# Patient Record
(demographics unavailable — no encounter records)

---
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
## 2024-10-15 NOTE — HISTORY OF PRESENT ILLNESS
[Lower back] : lower back [Buttock] : buttock [Left Leg] : left leg [Work related] : work related [Dull/Aching] : dull/aching [Radiating] : radiating [Shooting] : shooting [Throbbing] : throbbing [Constant] : constant [Household chores] : household chores [Leisure] : leisure [Work] : work [Sleep] : sleep [Rest] : rest [Nothing helps with pain getting better] : Nothing helps with pain getting better [Sitting] : sitting [Standing] : standing [Walking] : walking [Lying in bed] : lying in bed [6] : 6 [7] : 7 [FreeTextEntry1] : WC - DOI 10/17/2023  10/15/2024 SHARON BOSE is a 54 year-old woman presenting for a RPV for a history of chronic low back pain with LLE radicular features. On 9/27/2024, the patient underwent an L5-S1 LESI w/o MAC. She notes having 60% improvement of pain for 2 weeks but notes having some recurrence/worsening of pain after that time. Notes pain in the mid and low back with radiation to the left gluteal region and posterior thigh. No focal numbness or weakness in the lower extremities. No bowel or bladder incontinence or saddle anesthesia. The patient states that average pain over the past week was 6/10 in severity. Mood: Patient notes depression and anxiety. Sleep: Patient notes ongoing difficulty with sleep 2/2 pain.   8/16/2024: SHARON BOSE is a 54 year-old woman presenting for a RPV for a history of chronic low back pain with LLE radicular features. The patient underwent TPI in the left lumbar paraspinal region at last visit on 7/12/2024. The patient continues to note an aching pain across the low back with radiation to the left gluteal region and posterior thigh. No focal numbness or weakness in the lower extremities. No bowel or bladder incontinence or saddle anesthesia. The patient states that average pain over the past week was 6/10 in severity. Mood: Patient notes depression and anxiety. Sleep: Patient notes ongoing difficulty with sleep 2/2 pain.   7/12/2024 SHARON BOSE is a 54 year-old woman presenting for a RPV for a history of chronic low back pain with radiation to the left lower extremity. The patient underwent a LEFT L5-S1 TFESI w/o MAC on 6/26/2024. She had about 50% relief from buttocks pain. She notes some improvement with sitting and standing for longer periods. No focal numbness or weakness in the lower extremities. No bowel or bladder incontinence or saddle anesthesia. She continues to have an aching pain in the left low back with radiation to the left gluteal region and posterior thigh.  The patient states that average pain over the past week was 7/10 in severity. Mood: Patient notes depression and anxiety. Sleep: Patient notes ongoing difficulty with sleep 2/2 pain.   4/26/2024: SHARON BOSE is a 53 year-old woman presenting for a NPV for a history of chronic low back pain with radiation to the left lower extremity. The patient notes having acute onset of pain in the left gluteal region and posterior thigh and was diagnosed with a tear of the left hamstring. She has undergone PT with some improvement of pain. At this point, the patient endorses an aching pain across the low lumbar region with radiation to the left gluteal region and posterior thigh and leg. No focal numbness or weakness in the lower extremities. No bowel or bladder incontinence or saddle anesthesia. Pain is worse with sitting or standing for long periods.  The patient states that average pain over the past week was 5/10 in severity. Mood: Patient notes depression and anxiety. She has been taking medication for this but recently discontinued.  Sleep: Patient notes significant disturbance of sleep initiation and maintenance 2/2 pain.  [] : no [FreeTextEntry3] : 10/17/2023 [FreeTextEntry7] : L leg  [TWNoteComboBox1] : 0%

## 2024-10-15 NOTE — DATA REVIEWED
[MRI] : MRI [Lumbar Spine] : lumbar spine [Report was reviewed and noted in the chart] : The report was reviewed and noted in the chart [I independently reviewed and interpreted images and report] : I independently reviewed and interpreted images and report [FreeTextEntry1] : MRI Lumbar spine 10/25/2023: L2-L3: disc bulging, bony ridging, facet arthrosis, mild central stenosis and moderate bilateral NF narrowing L3-L4: disc bulging, bony ridging, facet arthrosis, mild central stenosis and moderate bilateral NF narrowing L4-L5: disc bulging, bony ridging, facet arthrosis, and moderate RIGHT NF narrowing and mild left NF narrowing L5-S1: disc bulging, bony ridging, facet arthrosis, and left foraminal herniation impinging on the LEFT L5 nerve root w/ asymmetric LEFT NF narrowing  MRI left femur 10/25/2023: High grade tearing at the left hamstring tendon insertion with 70-80% disruption and severe surrounding soft tissue swelling and bursitis

## 2024-10-15 NOTE — PHYSICAL EXAM
[de-identified] : Gen: NAD Head: NC/AT Eyes: no glasses, no scleral icterus ENT: mucous membranes moist CV: No JVD Lungs: nonlabored breathing Abd: soft, NT/ND Ext: full ROM in all extremities, no peripheral edema Back: +TTP in the bilateral low lumbar facet region; +SLR on the LEFT, +seated slump test on the left Neuro: CN intact LEs +5 L +5 R hip flexion +5 L +5 R leg extension +5 L +5 R leg flexion +5 L +5 R foot dorsiflexion +5 L +5 R foot plantarflexion +5 L +5 R EHL extension Psych: normal affect Skin: no visible lesions

## 2024-10-15 NOTE — PHYSICAL EXAM
[de-identified] : Gen: NAD Head: NC/AT Eyes: no glasses, no scleral icterus ENT: mucous membranes moist CV: No JVD Lungs: nonlabored breathing Abd: soft, NT/ND Ext: full ROM in all extremities, no peripheral edema Back: +TTP in the bilateral low lumbar facet region; +SLR on the LEFT, +seated slump test on the left Neuro: CN intact LEs +5 L +5 R hip flexion +5 L +5 R leg extension +5 L +5 R leg flexion +5 L +5 R foot dorsiflexion +5 L +5 R foot plantarflexion +5 L +5 R EHL extension Psych: normal affect Skin: no visible lesions

## 2024-10-31 NOTE — PLAN
[FreeTextEntry1] : Hereditary cancer gene screening offered and drawn Recommend colonoscopy Yearly mammo

## 2024-11-22 NOTE — IMAGING
[de-identified] : LSPINE Inspection: No rash or ecchymosis Palpation: No tenderness to palpation or spasm in bilateral thoracic., no SI joint tenderness to palpation  + Left lumbar paraspinal musculature ROM: diminished ROM secondary to pain Strength: 5/5 bilateral hip flexors, knee extensors, ankle dorsiflexors, EHL, ankle plantar flexors Sensation: Sensation present to light touch bilateral L2-S1 distributions Provocative maneuvers: + Left straight leg raise [Facet arthropathy] : Facet arthropathy [Scoliosis] : Scoliosis [AP] : anteroposterior [There are no fractures, subluxations or dislocations. No significant abnormalities are seen] : There are no fractures, subluxations or dislocations. No significant abnormalities are seen [FreeTextEntry1] : 28 levoconvex scoli L2-L4

## 2024-11-22 NOTE — ASSESSMENT
[FreeTextEntry1] : 28 levoconvex scoli L2-L4 L NF narrowing L2/3; Hypertrophic facet with effusion L5/S1 w/ L LR and NF stenosis  Will update MRI Discussed a focused approach including left-sided decompression L5/S1 to decompress the neural elements, while leaving her scoliosis alone
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
## 2025-01-24 NOTE — IMAGING
[Facet arthropathy] : Facet arthropathy [Scoliosis] : Scoliosis [AP] : anteroposterior [There are no fractures, subluxations or dislocations. No significant abnormalities are seen] : There are no fractures, subluxations or dislocations. No significant abnormalities are seen [de-identified] : LSPINE  Palpation: No tenderness to palpation or spasm in bilateral thoracic., no SI joint tenderness to palpation  + Left lumbar paraspinal musculature ROM: diminished ROM secondary to pain Strength: 5/5 bilateral hip flexors, knee extensors, ankle dorsiflexors, EHL, ankle plantar flexors Sensation: Sensation present to light touch bilateral L2-S1 distributions Provocative maneuvers: + Left straight leg raise [FreeTextEntry1] : 28 levoconvex scoli L2-L4
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
## 2025-01-24 NOTE — ASSESSMENT
[FreeTextEntry1] : 28 levoconvex scoli L2-L4 L NF narrowing L2/3; Hypertrophic facet with effusion L5/S1 w/ L LR and NF stenosis  Discussed indications for surgery  Isolated fusion L5/S1 below her scoliosis not necessarily feasible  Gabapentin- Patient advised of sedating effects, instructed not to drive, operate machinery, or take with other sedating medications. Advised of need to taper on/off medication and risk of abruptly stopping gabapentin.

## 2025-02-14 NOTE — PHYSICAL EXAM
[de-identified] : Gen: NAD Head: NC/AT Eyes: no glasses, no scleral icterus ENT: mucous membranes moist CV: No JVD Lungs: nonlabored breathing Abd: soft, NT/ND Ext: full ROM in all extremities, no peripheral edema Back: +TTP in the bilateral low lumbar facet region; +SLR on the LEFT, +seated slump test on the left Neuro: CN intact LEs +5 L +5 R hip flexion +5 L +5 R leg extension +5 L +5 R leg flexion +5 L +5 R foot dorsiflexion +5 L +5 R foot plantarflexion +5 L +5 R EHL extension Psych: normal affect Skin: no visible lesions
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
## 2025-02-14 NOTE — HISTORY OF PRESENT ILLNESS
[7] : 7 [FreeTextEntry1] : WC - DOI 10/17/2023  2/14/2025 SHAORN BOSE is a 54 year-old woman presenting for a RPV for a history of chronic low back pain. Patient notes that she continues to have significant pain in the low back w/ radiation to the left gluteal region and posterior thigh and leg. No focal numbness or weakness in the lower extremities. No bowel or bladder incontinence or saddle anesthesia. Discussed surgical tx w/ Dr. Aguayo but does not feel ready to undergo fusion.  The patient states that average pain over the past week was 7/10 in severity. Mood: Patient notes depression and anxiety. Sleep: Patient notes ongoing difficulty with sleep 2/2 pain.   10/15/2024 SHARON BOSE is a 54 year-old woman presenting for a RPV for a history of chronic low back pain with LLE radicular features. On 9/27/2024, the patient underwent an L5-S1 LESI w/o MAC. She notes having 60% improvement of pain for 2 weeks but notes having some recurrence/worsening of pain after that time. Notes pain in the mid and low back with radiation to the left gluteal region and posterior thigh. No focal numbness or weakness in the lower extremities. No bowel or bladder incontinence or saddle anesthesia. The patient states that average pain over the past week was 6/10 in severity. Mood: Patient notes depression and anxiety. Sleep: Patient notes ongoing difficulty with sleep 2/2 pain.   8/16/2024: SHARON BOSE is a 54 year-old woman presenting for a RPV for a history of chronic low back pain with LLE radicular features. The patient underwent TPI in the left lumbar paraspinal region at last visit on 7/12/2024. The patient continues to note an aching pain across the low back with radiation to the left gluteal region and posterior thigh. No focal numbness or weakness in the lower extremities. No bowel or bladder incontinence or saddle anesthesia. The patient states that average pain over the past week was 6/10 in severity. Mood: Patient notes depression and anxiety. Sleep: Patient notes ongoing difficulty with sleep 2/2 pain.   7/12/2024 SHARON BOSE is a 54 year-old woman presenting for a RPV for a history of chronic low back pain with radiation to the left lower extremity. The patient underwent a LEFT L5-S1 TFESI w/o MAC on 6/26/2024. She had about 50% relief from buttocks pain. She notes some improvement with sitting and standing for longer periods. No focal numbness or weakness in the lower extremities. No bowel or bladder incontinence or saddle anesthesia. She continues to have an aching pain in the left low back with radiation to the left gluteal region and posterior thigh.  The patient states that average pain over the past week was 7/10 in severity. Mood: Patient notes depression and anxiety. Sleep: Patient notes ongoing difficulty with sleep 2/2 pain.   4/26/2024: SHARON BOSE is a 53 year-old woman presenting for a NPV for a history of chronic low back pain with radiation to the left lower extremity. The patient notes having acute onset of pain in the left gluteal region and posterior thigh and was diagnosed with a tear of the left hamstring. She has undergone PT with some improvement of pain. At this point, the patient endorses an aching pain across the low lumbar region with radiation to the left gluteal region and posterior thigh and leg. No focal numbness or weakness in the lower extremities. No bowel or bladder incontinence or saddle anesthesia. Pain is worse with sitting or standing for long periods.  The patient states that average pain over the past week was 5/10 in severity. Mood: Patient notes depression and anxiety. She has been taking medication for this but recently discontinued.  Sleep: Patient notes significant disturbance of sleep initiation and maintenance 2/2 pain.  [] : no [FreeTextEntry3] : 10/17/2023 [FreeTextEntry7] : L leg  [TWNoteComboBox1] : 0%
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
## 2025-02-14 NOTE — PHYSICAL EXAM
[de-identified] : Gen: NAD Head: NC/AT Eyes: no glasses, no scleral icterus ENT: mucous membranes moist CV: No JVD Lungs: nonlabored breathing Abd: soft, NT/ND Ext: full ROM in all extremities, no peripheral edema Back: +TTP in the bilateral low lumbar facet region; +SLR on the LEFT, +seated slump test on the left Neuro: CN intact LEs +5 L +5 R hip flexion +5 L +5 R leg extension +5 L +5 R leg flexion +5 L +5 R foot dorsiflexion +5 L +5 R foot plantarflexion +5 L +5 R EHL extension Psych: normal affect Skin: no visible lesions

## 2025-02-14 NOTE — HISTORY OF PRESENT ILLNESS
[7] : 7 [FreeTextEntry1] : WC - DOI 10/17/2023  2/14/2025 SHARON BOSE is a 54 year-old woman presenting for a RPV for a history of chronic low back pain. Patient notes that she continues to have significant pain in the low back w/ radiation to the left gluteal region and posterior thigh and leg. No focal numbness or weakness in the lower extremities. No bowel or bladder incontinence or saddle anesthesia. Discussed surgical tx w/ Dr. Aguayo but does not feel ready to undergo fusion.  The patient states that average pain over the past week was 7/10 in severity. Mood: Patient notes depression and anxiety. Sleep: Patient notes ongoing difficulty with sleep 2/2 pain.   10/15/2024 SHARON BOSE is a 54 year-old woman presenting for a RPV for a history of chronic low back pain with LLE radicular features. On 9/27/2024, the patient underwent an L5-S1 LESI w/o MAC. She notes having 60% improvement of pain for 2 weeks but notes having some recurrence/worsening of pain after that time. Notes pain in the mid and low back with radiation to the left gluteal region and posterior thigh. No focal numbness or weakness in the lower extremities. No bowel or bladder incontinence or saddle anesthesia. The patient states that average pain over the past week was 6/10 in severity. Mood: Patient notes depression and anxiety. Sleep: Patient notes ongoing difficulty with sleep 2/2 pain.   8/16/2024: SHARON BOSE is a 54 year-old woman presenting for a RPV for a history of chronic low back pain with LLE radicular features. The patient underwent TPI in the left lumbar paraspinal region at last visit on 7/12/2024. The patient continues to note an aching pain across the low back with radiation to the left gluteal region and posterior thigh. No focal numbness or weakness in the lower extremities. No bowel or bladder incontinence or saddle anesthesia. The patient states that average pain over the past week was 6/10 in severity. Mood: Patient notes depression and anxiety. Sleep: Patient notes ongoing difficulty with sleep 2/2 pain.   7/12/2024 SHARON BOSE is a 54 year-old woman presenting for a RPV for a history of chronic low back pain with radiation to the left lower extremity. The patient underwent a LEFT L5-S1 TFESI w/o MAC on 6/26/2024. She had about 50% relief from buttocks pain. She notes some improvement with sitting and standing for longer periods. No focal numbness or weakness in the lower extremities. No bowel or bladder incontinence or saddle anesthesia. She continues to have an aching pain in the left low back with radiation to the left gluteal region and posterior thigh.  The patient states that average pain over the past week was 7/10 in severity. Mood: Patient notes depression and anxiety. Sleep: Patient notes ongoing difficulty with sleep 2/2 pain.   4/26/2024: SHARON BOSE is a 53 year-old woman presenting for a NPV for a history of chronic low back pain with radiation to the left lower extremity. The patient notes having acute onset of pain in the left gluteal region and posterior thigh and was diagnosed with a tear of the left hamstring. She has undergone PT with some improvement of pain. At this point, the patient endorses an aching pain across the low lumbar region with radiation to the left gluteal region and posterior thigh and leg. No focal numbness or weakness in the lower extremities. No bowel or bladder incontinence or saddle anesthesia. Pain is worse with sitting or standing for long periods.  The patient states that average pain over the past week was 5/10 in severity. Mood: Patient notes depression and anxiety. She has been taking medication for this but recently discontinued.  Sleep: Patient notes significant disturbance of sleep initiation and maintenance 2/2 pain.  [] : no [FreeTextEntry3] : 10/17/2023 [FreeTextEntry7] : L leg  [TWNoteComboBox1] : 0%

## 2025-02-14 NOTE — DATA REVIEWED
[FreeTextEntry1] : 12/27/2024 MRI Lumbar Spine O&C L5-S1: small disc bulge, mild central stenosis, mild to moderate bilateral facet OA w/ small effusion on the LEFT; mild LEFT NF stenosis  MRI Lumbar spine 10/25/2023: L2-L3: disc bulging, bony ridging, facet arthrosis, mild central stenosis and moderate bilateral NF narrowing L3-L4: disc bulging, bony ridging, facet arthrosis, mild central stenosis and moderate bilateral NF narrowing L4-L5: disc bulging, bony ridging, facet arthrosis, and moderate RIGHT NF narrowing and mild left NF narrowing L5-S1: disc bulging, bony ridging, facet arthrosis, and left foraminal herniation impinging on the LEFT L5 nerve root w/ asymmetric LEFT NF narrowing  MRI left femur 10/25/2023: High grade tearing at the left hamstring tendon insertion with 70-80% disruption and severe surrounding soft tissue swelling and bursitis
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
## 2025-03-07 NOTE — ASSESSMENT
[FreeTextEntry1] : 28 levoconvex scoli L2-L4 L NF narrowing L2/3; Hypertrophic facet with effusion L5/S1 w/ L LR and NF stenosis  Discussed indications for surgery  Isolated fusion L5/S1 below her scoliosis not necessarily feasible c/w gabapentin, discussed titrating medication up to 200mg HS continue to follow up with pain management for repeat injection FU in 6-8 weeks Gabapentin- Patient advised of sedating effects, instructed not to drive, operate machinery, or take with other sedating medications. Advised of need to taper on/off medication and risk of abruptly stopping gabapentin.

## 2025-03-07 NOTE — IMAGING
[Facet arthropathy] : Facet arthropathy [Scoliosis] : Scoliosis [AP] : anteroposterior [There are no fractures, subluxations or dislocations. No significant abnormalities are seen] : There are no fractures, subluxations or dislocations. No significant abnormalities are seen [de-identified] : LSPINE  Palpation: No tenderness to palpation or spasm in bilateral thoracic., no SI joint tenderness to palpation  + Left lumbar paraspinal musculature ROM: diminished ROM secondary to pain Strength: 5/5 bilateral hip flexors, knee extensors, ankle dorsiflexors, EHL, ankle plantar flexors Sensation: Sensation present to light touch bilateral L2-S1 distributions Provocative maneuvers: + Left straight leg raise [FreeTextEntry1] : 28 levoconvex scoli L2-L4

## 2025-05-02 NOTE — PROCEDURE
[FreeTextEntry1] : LEFT L5-S1, S1 transforaminal epidural steroid injection [FreeTextEntry2] : chronic lumbar radiculopathy [FreeTextEntry3] : Procedure Date: 05/02/2025   Preoperative Diagnosis: chronic lumbar radiculopathy   Procedure: LEFT L5-S1, S1 transforaminal epidural steroid injection under fluoroscopic guidance  Anesthesia: local  Complications: none   EBL: none   Procedure in detail:   Patient was seen and examined. Risks, benefits, and alternatives for the procedure were discussed with the patient in detail. The patient expressed understanding, gave written and verbal consent, and placed themselves in a prone position on the procedure table. Skin overlying the lumbosacral spine was prepped with chloraprep and draped in the usual sterile fashion. Fluoroscopic images were obtained to identify the L5 and S1 vertebral bodies. Target was the 6 o'clock position under the LEFT pedicle at the L5 and S1 vertebral level. Skin overlying the target was marked and infiltrated with 1% lidocaine. Using two 22 gauge, 5 inch spinal needles, these were inserted and advanced under intermittent fluoroscopic guidance. When felt to be engaged in the epidural space, lateral view was used to confirm depth. After negative aspiration for heme/csf, contrast was injected under live fluoroscopy, which showed contrast spread consistent with epidural flow. No evidence of intravascular or intrathecal uptake. At this point, a total of 2ml of injectate, which consisted of 1ml of 6mg/ml betamethasone and 1ml of normal saline were injected through each needle. The needles were restyletted and withdrawn, a band aid was placed over the injection site. Patient tolerated the procedure well. The patient recovered uneventfully and was discharged home in stable condition.

## 2025-05-20 NOTE — PHYSICAL EXAM
[de-identified] : Gen: NAD Head: NC/AT Eyes: no glasses, no scleral icterus ENT: mucous membranes moist CV: No JVD Lungs: nonlabored breathing Abd: soft, NT/ND Ext: full ROM in all extremities, no peripheral edema Back: +TTP in the bilateral low lumbar facet region; +SLR on the LEFT, +seated slump test on the left Neuro: CN intact LEs +5 L +5 R hip flexion +5 L +5 R leg extension +5 L +5 R leg flexion +5 L +5 R foot dorsiflexion +5 L +5 R foot plantarflexion +5 L +5 R EHL extension Psych: normal affect Skin: no visible lesions

## 2025-05-20 NOTE — HISTORY OF PRESENT ILLNESS
[Lower back] : lower back [Buttock] : buttock [Left Leg] : left leg [Work related] : work related [Dull/Aching] : dull/aching [Radiating] : radiating [Shooting] : shooting [Throbbing] : throbbing [Constant] : constant [Household chores] : household chores [Leisure] : leisure [Work] : work [Sleep] : sleep [Rest] : rest [Nothing helps with pain getting better] : Nothing helps with pain getting better [Sitting] : sitting [Standing] : standing [Walking] : walking [Lying in bed] : lying in bed [FreeTextEntry1] : WC - DOI 10/17/2023  5/20/2025: SHARON BOSE is a 54 year-old woman presenting for a RPV for a history of chronic low back pain. The patient underwent a LEFT L5-S1, S1 TFESI w/o sedation on 5/2/2025. The patient notes having 50% relief pp - still with pain in L glut and into LLE;  ** Greatest relief with this TFESI so far; states that she has also been having pain and edema in L knee which she did injure in fall. The patient states that average pain over the past week was 5/10 in severity. Mood: Patient notes depression/anxiety; seen by psychiatrist and psychologist; ADHD and panic  Sleep: On going difficulty sleeping   2/14/2025 SHARON BOSE is a 54 year-old woman presenting for a RPV for a history of chronic low back pain. Patient notes that she continues to have significant pain in the low back w/ radiation to the left gluteal region and posterior thigh and leg. No focal numbness or weakness in the lower extremities. No bowel or bladder incontinence or saddle anesthesia. Discussed surgical tx w/ Dr. Aguayo but does not feel ready to undergo fusion.  The patient states that average pain over the past week was 7/10 in severity. Mood: Patient notes depression and anxiety. Sleep: Patient notes ongoing difficulty with sleep 2/2 pain.   10/15/2024 SHARON BOSE is a 54 year-old woman presenting for a RPV for a history of chronic low back pain with LLE radicular features. On 9/27/2024, the patient underwent an L5-S1 LESI w/o MAC. She notes having 60% improvement of pain for 2 weeks but notes having some recurrence/worsening of pain after that time. Notes pain in the mid and low back with radiation to the left gluteal region and posterior thigh. No focal numbness or weakness in the lower extremities. No bowel or bladder incontinence or saddle anesthesia. The patient states that average pain over the past week was 6/10 in severity. Mood: Patient notes depression and anxiety. Sleep: Patient notes ongoing difficulty with sleep 2/2 pain.   8/16/2024: SHARON BOSE is a 54 year-old woman presenting for a RPV for a history of chronic low back pain with LLE radicular features. The patient underwent TPI in the left lumbar paraspinal region at last visit on 7/12/2024. The patient continues to note an aching pain across the low back with radiation to the left gluteal region and posterior thigh. No focal numbness or weakness in the lower extremities. No bowel or bladder incontinence or saddle anesthesia. The patient states that average pain over the past week was 6/10 in severity. Mood: Patient notes depression and anxiety. Sleep: Patient notes ongoing difficulty with sleep 2/2 pain.   7/12/2024 SHARON BOSE is a 54 year-old woman presenting for a RPV for a history of chronic low back pain with radiation to the left lower extremity. The patient underwent a LEFT L5-S1 TFESI w/o MAC on 6/26/2024. She had about 50% relief from buttocks pain. She notes some improvement with sitting and standing for longer periods. No focal numbness or weakness in the lower extremities. No bowel or bladder incontinence or saddle anesthesia. She continues to have an aching pain in the left low back with radiation to the left gluteal region and posterior thigh.  The patient states that average pain over the past week was 7/10 in severity. Mood: Patient notes depression and anxiety. Sleep: Patient notes ongoing difficulty with sleep 2/2 pain.   4/26/2024: SHARON BOSE is a 53 year-old woman presenting for a NPV for a history of chronic low back pain with radiation to the left lower extremity. The patient notes having acute onset of pain in the left gluteal region and posterior thigh and was diagnosed with a tear of the left hamstring. She has undergone PT with some improvement of pain. At this point, the patient endorses an aching pain across the low lumbar region with radiation to the left gluteal region and posterior thigh and leg. No focal numbness or weakness in the lower extremities. No bowel or bladder incontinence or saddle anesthesia. Pain is worse with sitting or standing for long periods.  The patient states that average pain over the past week was 5/10 in severity. Mood: Patient notes depression and anxiety. She has been taking medication for this but recently discontinued.  Sleep: Patient notes significant disturbance of sleep initiation and maintenance 2/2 pain.  [4] : 4 [5] : 5 [] : no [FreeTextEntry3] : 10/17/2023 [FreeTextEntry7] : L leg  [TWNoteComboBox1] : 30%
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
## 2025-05-20 NOTE — DATA REVIEWED
[MRI] : MRI [Lumbar Spine] : lumbar spine [Report was reviewed and noted in the chart] : The report was reviewed and noted in the chart [I independently reviewed and interpreted images and report] : I independently reviewed and interpreted images and report [FreeTextEntry1] : 12/27/2024 MRI Lumbar Spine O&C L5-S1: small disc bulge, mild central stenosis, mild to moderate bilateral facet OA w/ small effusion on the LEFT; mild LEFT NF stenosis  MRI Lumbar spine 10/25/2023: L2-L3: disc bulging, bony ridging, facet arthrosis, mild central stenosis and moderate bilateral NF narrowing L3-L4: disc bulging, bony ridging, facet arthrosis, mild central stenosis and moderate bilateral NF narrowing L4-L5: disc bulging, bony ridging, facet arthrosis, and moderate RIGHT NF narrowing and mild left NF narrowing L5-S1: disc bulging, bony ridging, facet arthrosis, and left foraminal herniation impinging on the LEFT L5 nerve root w/ asymmetric LEFT NF narrowing  MRI left femur 10/25/2023: High grade tearing at the left hamstring tendon insertion with 70-80% disruption and severe surrounding soft tissue swelling and bursitis
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
## 2025-07-11 NOTE — HISTORY OF PRESENT ILLNESS
[Lower back] : lower back [Buttock] : buttock [Left Leg] : left leg [Work related] : work related [4] : 4 [5] : 5 [Dull/Aching] : dull/aching [Radiating] : radiating [Shooting] : shooting [Throbbing] : throbbing [Constant] : constant [Household chores] : household chores [Leisure] : leisure [Work] : work [Sleep] : sleep [Rest] : rest [Nothing helps with pain getting better] : Nothing helps with pain getting better [Sitting] : sitting [Standing] : standing [Walking] : walking [Lying in bed] : lying in bed [6] : 6 [FreeTextEntry1] : WC - DOI 10/17/2023  7/11/2025 SHARON BOSE is a 55 year-old woman presenting for a RPV for a history of chronic low back pain. Patient notes having 2+ months of relief from LEFT TFESI on 5/2/2025. However, notes recent worsening of pain in the left gluteal region and posterior thigh and leg. No focal numbness or weakness in the lower extremities. No bowel or bladder incontinence or saddle anesthesia.  The patient states that average pain over the past week was 6/10 in severity. Mood: Patient notes depression/anxiety; seen by psychiatrist and psychologist; ADHD and panic  Sleep: Still w/ significant difficulty with sleep 2/2 pain.   5/20/2025: SHARON BOSE is a 54 year-old woman presenting for a RPV for a history of chronic low back pain. The patient underwent a LEFT L5-S1, S1 TFESI w/o sedation on 5/2/2025. The patient notes having 50% relief pp - still with pain in L glut and into LLE;  ** Greatest relief with this TFESI so far; states that she has also been having pain and edema in L knee which she did injure in fall. The patient states that average pain over the past week was 5/10 in severity. Mood: Patient notes depression/anxiety; seen by psychiatrist and psychologist; ADHD and panic  Sleep: On going difficulty sleeping   2/14/2025 SHARON BOSE is a 54 year-old woman presenting for a RPV for a history of chronic low back pain. Patient notes that she continues to have significant pain in the low back w/ radiation to the left gluteal region and posterior thigh and leg. No focal numbness or weakness in the lower extremities. No bowel or bladder incontinence or saddle anesthesia. Discussed surgical tx w/ Dr. Aguayo but does not feel ready to undergo fusion.  The patient states that average pain over the past week was 7/10 in severity. Mood: Patient notes depression and anxiety. Sleep: Patient notes ongoing difficulty with sleep 2/2 pain.   10/15/2024 SHARON BOSE is a 54 year-old woman presenting for a RPV for a history of chronic low back pain with LLE radicular features. On 9/27/2024, the patient underwent an L5-S1 LESI w/o MAC. She notes having 60% improvement of pain for 2 weeks but notes having some recurrence/worsening of pain after that time. Notes pain in the mid and low back with radiation to the left gluteal region and posterior thigh. No focal numbness or weakness in the lower extremities. No bowel or bladder incontinence or saddle anesthesia. The patient states that average pain over the past week was 6/10 in severity. Mood: Patient notes depression and anxiety. Sleep: Patient notes ongoing difficulty with sleep 2/2 pain.   8/16/2024: SHARON BOSE is a 54 year-old woman presenting for a RPV for a history of chronic low back pain with LLE radicular features. The patient underwent TPI in the left lumbar paraspinal region at last visit on 7/12/2024. The patient continues to note an aching pain across the low back with radiation to the left gluteal region and posterior thigh. No focal numbness or weakness in the lower extremities. No bowel or bladder incontinence or saddle anesthesia. The patient states that average pain over the past week was 6/10 in severity. Mood: Patient notes depression and anxiety. Sleep: Patient notes ongoing difficulty with sleep 2/2 pain.   7/12/2024 SHARON BOSE is a 54 year-old woman presenting for a RPV for a history of chronic low back pain with radiation to the left lower extremity. The patient underwent a LEFT L5-S1 TFESI w/o MAC on 6/26/2024. She had about 50% relief from buttocks pain. She notes some improvement with sitting and standing for longer periods. No focal numbness or weakness in the lower extremities. No bowel or bladder incontinence or saddle anesthesia. She continues to have an aching pain in the left low back with radiation to the left gluteal region and posterior thigh.  The patient states that average pain over the past week was 7/10 in severity. Mood: Patient notes depression and anxiety. Sleep: Patient notes ongoing difficulty with sleep 2/2 pain.   4/26/2024: SHARON BOSE is a 53 year-old woman presenting for a NPV for a history of chronic low back pain with radiation to the left lower extremity. The patient notes having acute onset of pain in the left gluteal region and posterior thigh and was diagnosed with a tear of the left hamstring. She has undergone PT with some improvement of pain. At this point, the patient endorses an aching pain across the low lumbar region with radiation to the left gluteal region and posterior thigh and leg. No focal numbness or weakness in the lower extremities. No bowel or bladder incontinence or saddle anesthesia. Pain is worse with sitting or standing for long periods.  The patient states that average pain over the past week was 5/10 in severity. Mood: Patient notes depression and anxiety. She has been taking medication for this but recently discontinued.  Sleep: Patient notes significant disturbance of sleep initiation and maintenance 2/2 pain.    [] : no [FreeTextEntry3] : 10/17/2023 [FreeTextEntry7] : L leg  [TWNoteComboBox1] : 30%
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
## 2025-07-11 NOTE — PHYSICAL EXAM
[de-identified] : Gen: NAD Head: NC/AT Eyes: no glasses, no scleral icterus ENT: mucous membranes moist CV: No JVD Lungs: nonlabored breathing Abd: soft, NT/ND Ext: full ROM in all extremities, no peripheral edema Back: +TTP in the bilateral low lumbar facet region; +SLR on the LEFT, +seated slump test on the left Neuro: CN intact LEs +5 L +5 R hip flexion +5 L +5 R leg extension +5 L +5 R leg flexion +5 L +5 R foot dorsiflexion +5 L +5 R foot plantarflexion +5 L +5 R EHL extension Psych: normal affect Skin: no visible lesions

## 2025-07-11 NOTE — PHYSICAL EXAM
[de-identified] : Gen: NAD Head: NC/AT Eyes: no glasses, no scleral icterus ENT: mucous membranes moist CV: No JVD Lungs: nonlabored breathing Abd: soft, NT/ND Ext: full ROM in all extremities, no peripheral edema Back: +TTP in the bilateral low lumbar facet region; +SLR on the LEFT, +seated slump test on the left Neuro: CN intact LEs +5 L +5 R hip flexion +5 L +5 R leg extension +5 L +5 R leg flexion +5 L +5 R foot dorsiflexion +5 L +5 R foot plantarflexion +5 L +5 R EHL extension Psych: normal affect Skin: no visible lesions